# Patient Record
Sex: FEMALE | Race: WHITE | Employment: FULL TIME | URBAN - METROPOLITAN AREA
[De-identification: names, ages, dates, MRNs, and addresses within clinical notes are randomized per-mention and may not be internally consistent; named-entity substitution may affect disease eponyms.]

---

## 2019-03-17 ENCOUNTER — HOSPITAL ENCOUNTER (EMERGENCY)
Age: 34
Discharge: HOME | End: 2019-03-17
Attending: EMERGENCY MEDICINE
Payer: COMMERCIAL

## 2019-03-17 ENCOUNTER — APPOINTMENT (OUTPATIENT)
Dept: CT IMAGING | Age: 34
End: 2019-03-17
Payer: COMMERCIAL

## 2019-03-17 VITALS
OXYGEN SATURATION: 99 % | HEIGHT: 70 IN | SYSTOLIC BLOOD PRESSURE: 143 MMHG | BODY MASS INDEX: 21.47 KG/M2 | RESPIRATION RATE: 16 BRPM | TEMPERATURE: 97.7 F | HEART RATE: 90 BPM | WEIGHT: 150 LBS | DIASTOLIC BLOOD PRESSURE: 84 MMHG

## 2019-03-17 DIAGNOSIS — J02.9 ACUTE PHARYNGITIS, UNSPECIFIED ETIOLOGY: Primary | ICD-10-CM

## 2019-03-17 LAB
A/G RATIO: 1.6 (ref 1.1–2.2)
ALBUMIN SERPL-MCNC: 4.5 G/DL (ref 3.4–5)
ALP BLD-CCNC: 41 U/L (ref 40–129)
ALT SERPL-CCNC: 10 U/L (ref 10–40)
ANION GAP SERPL CALCULATED.3IONS-SCNC: 10 MMOL/L (ref 3–16)
AST SERPL-CCNC: 13 U/L (ref 15–37)
BASOPHILS ABSOLUTE: 0 K/UL (ref 0–0.2)
BASOPHILS RELATIVE PERCENT: 0.6 %
BILIRUB SERPL-MCNC: 0.4 MG/DL (ref 0–1)
BUN BLDV-MCNC: 10 MG/DL (ref 7–20)
CALCIUM SERPL-MCNC: 9.6 MG/DL (ref 8.3–10.6)
CHLORIDE BLD-SCNC: 102 MMOL/L (ref 99–110)
CO2: 27 MMOL/L (ref 21–32)
CREAT SERPL-MCNC: 0.7 MG/DL (ref 0.6–1.1)
EOSINOPHILS ABSOLUTE: 0.1 K/UL (ref 0–0.6)
EOSINOPHILS RELATIVE PERCENT: 1.1 %
GFR AFRICAN AMERICAN: >60
GFR NON-AFRICAN AMERICAN: >60
GLOBULIN: 2.9 G/DL
GLUCOSE BLD-MCNC: 134 MG/DL (ref 70–99)
HCG QUALITATIVE: NEGATIVE
HCT VFR BLD CALC: 44.4 % (ref 36–48)
HEMOGLOBIN: 14.9 G/DL (ref 12–16)
LYMPHOCYTES ABSOLUTE: 1.1 K/UL (ref 1–5.1)
LYMPHOCYTES RELATIVE PERCENT: 14.1 %
MCH RBC QN AUTO: 31.6 PG (ref 26–34)
MCHC RBC AUTO-ENTMCNC: 33.7 G/DL (ref 31–36)
MCV RBC AUTO: 94 FL (ref 80–100)
MONO TEST: NEGATIVE
MONOCYTES ABSOLUTE: 0.4 K/UL (ref 0–1.3)
MONOCYTES RELATIVE PERCENT: 5.4 %
NEUTROPHILS ABSOLUTE: 5.9 K/UL (ref 1.7–7.7)
NEUTROPHILS RELATIVE PERCENT: 78.8 %
PDW BLD-RTO: 12.6 % (ref 12.4–15.4)
PLATELET # BLD: 220 K/UL (ref 135–450)
PMV BLD AUTO: 7.5 FL (ref 5–10.5)
POTASSIUM SERPL-SCNC: 4.2 MMOL/L (ref 3.5–5.1)
RBC # BLD: 4.72 M/UL (ref 4–5.2)
S PYO AG THROAT QL: NEGATIVE
SODIUM BLD-SCNC: 139 MMOL/L (ref 136–145)
TOTAL PROTEIN: 7.4 G/DL (ref 6.4–8.2)
WBC # BLD: 7.6 K/UL (ref 4–11)

## 2019-03-17 PROCEDURE — 6360000002 HC RX W HCPCS: Performed by: PHYSICIAN ASSISTANT

## 2019-03-17 PROCEDURE — 87081 CULTURE SCREEN ONLY: CPT

## 2019-03-17 PROCEDURE — 86308 HETEROPHILE ANTIBODY SCREEN: CPT

## 2019-03-17 PROCEDURE — 87880 STREP A ASSAY W/OPTIC: CPT

## 2019-03-17 PROCEDURE — 70491 CT SOFT TISSUE NECK W/DYE: CPT

## 2019-03-17 PROCEDURE — 96374 THER/PROPH/DIAG INJ IV PUSH: CPT

## 2019-03-17 PROCEDURE — 85025 COMPLETE CBC W/AUTO DIFF WBC: CPT

## 2019-03-17 PROCEDURE — 84703 CHORIONIC GONADOTROPIN ASSAY: CPT

## 2019-03-17 PROCEDURE — 6360000004 HC RX CONTRAST MEDICATION: Performed by: EMERGENCY MEDICINE

## 2019-03-17 PROCEDURE — 87591 N.GONORRHOEAE DNA AMP PROB: CPT

## 2019-03-17 PROCEDURE — 80053 COMPREHEN METABOLIC PANEL: CPT

## 2019-03-17 PROCEDURE — 99283 EMERGENCY DEPT VISIT LOW MDM: CPT

## 2019-03-17 PROCEDURE — 87491 CHLMYD TRACH DNA AMP PROBE: CPT

## 2019-03-17 RX ORDER — DEXAMETHASONE 4 MG/1
10 TABLET ORAL ONCE
Status: COMPLETED | OUTPATIENT
Start: 2019-03-17 | End: 2019-03-17

## 2019-03-17 RX ORDER — KETOROLAC TROMETHAMINE 30 MG/ML
30 INJECTION, SOLUTION INTRAMUSCULAR; INTRAVENOUS ONCE
Status: COMPLETED | OUTPATIENT
Start: 2019-03-17 | End: 2019-03-17

## 2019-03-17 RX ADMIN — KETOROLAC TROMETHAMINE 30 MG: 30 INJECTION, SOLUTION INTRAMUSCULAR at 17:45

## 2019-03-17 RX ADMIN — IOPAMIDOL 75 ML: 755 INJECTION, SOLUTION INTRAVENOUS at 18:09

## 2019-03-17 RX ADMIN — DEXAMETHASONE 10 MG: 4 TABLET ORAL at 17:45

## 2019-03-17 ASSESSMENT — ENCOUNTER SYMPTOMS
TROUBLE SWALLOWING: 0
SORE THROAT: 1
COUGH: 0
BACK PAIN: 0
NAUSEA: 0
BLOOD IN STOOL: 0
VOICE CHANGE: 0
DIARRHEA: 0
ABDOMINAL PAIN: 0
VOMITING: 0
SHORTNESS OF BREATH: 0

## 2019-03-17 ASSESSMENT — PAIN SCALES - GENERAL
PAINLEVEL_OUTOF10: 6
PAINLEVEL_OUTOF10: 6

## 2019-03-19 LAB
C TRACH DNA GENITAL QL NAA+PROBE: NEGATIVE
N. GONORRHOEAE DNA: NEGATIVE
S PYO THROAT QL CULT: NORMAL

## 2019-03-20 ENCOUNTER — OFFICE VISIT (OUTPATIENT)
Dept: ENT CLINIC | Age: 34
End: 2019-03-20
Payer: COMMERCIAL

## 2019-03-20 VITALS
WEIGHT: 150 LBS | HEART RATE: 65 BPM | BODY MASS INDEX: 21.47 KG/M2 | HEIGHT: 70 IN | DIASTOLIC BLOOD PRESSURE: 73 MMHG | SYSTOLIC BLOOD PRESSURE: 117 MMHG

## 2019-03-20 DIAGNOSIS — K21.9 LARYNGOPHARYNGEAL REFLUX (LPR): Primary | ICD-10-CM

## 2019-03-20 DIAGNOSIS — R09.89 GLOBUS SENSATION: ICD-10-CM

## 2019-03-20 PROCEDURE — 1036F TOBACCO NON-USER: CPT | Performed by: OTOLARYNGOLOGY

## 2019-03-20 PROCEDURE — G8420 CALC BMI NORM PARAMETERS: HCPCS | Performed by: OTOLARYNGOLOGY

## 2019-03-20 PROCEDURE — G8427 DOCREV CUR MEDS BY ELIG CLIN: HCPCS | Performed by: OTOLARYNGOLOGY

## 2019-03-20 PROCEDURE — 99204 OFFICE O/P NEW MOD 45 MIN: CPT | Performed by: OTOLARYNGOLOGY

## 2019-03-20 PROCEDURE — G8484 FLU IMMUNIZE NO ADMIN: HCPCS | Performed by: OTOLARYNGOLOGY

## 2019-03-20 ASSESSMENT — ENCOUNTER SYMPTOMS
COLOR CHANGE: 0
NAUSEA: 0
WHEEZING: 0
VOICE CHANGE: 0
CHOKING: 0
BLOOD IN STOOL: 0
SHORTNESS OF BREATH: 0
CONSTIPATION: 0
SORE THROAT: 0
BACK PAIN: 0
CHEST TIGHTNESS: 0
TROUBLE SWALLOWING: 0
VOMITING: 0
SINUS PRESSURE: 0
APNEA: 0
EYE PAIN: 0
DIARRHEA: 0
STRIDOR: 0
COUGH: 0
FACIAL SWELLING: 0
SINUS PAIN: 0
RHINORRHEA: 0
EYE DISCHARGE: 0

## 2019-04-23 ENCOUNTER — OFFICE VISIT (OUTPATIENT)
Dept: ENT CLINIC | Age: 34
End: 2019-04-23
Payer: COMMERCIAL

## 2019-04-23 VITALS
BODY MASS INDEX: 22.15 KG/M2 | HEIGHT: 70 IN | SYSTOLIC BLOOD PRESSURE: 107 MMHG | HEART RATE: 85 BPM | WEIGHT: 154.7 LBS | DIASTOLIC BLOOD PRESSURE: 69 MMHG

## 2019-04-23 DIAGNOSIS — J02.9 ACUTE PHARYNGITIS, UNSPECIFIED ETIOLOGY: ICD-10-CM

## 2019-04-23 DIAGNOSIS — E04.9 GOITER: ICD-10-CM

## 2019-04-23 DIAGNOSIS — E04.1 THYROID NODULE: ICD-10-CM

## 2019-04-23 DIAGNOSIS — J37.0 CHRONIC LARYNGITIS: ICD-10-CM

## 2019-04-23 DIAGNOSIS — K21.9 LPRD (LARYNGOPHARYNGEAL REFLUX DISEASE): Primary | ICD-10-CM

## 2019-04-23 PROCEDURE — 99214 OFFICE O/P EST MOD 30 MIN: CPT | Performed by: OTOLARYNGOLOGY

## 2019-04-23 PROCEDURE — 1036F TOBACCO NON-USER: CPT | Performed by: OTOLARYNGOLOGY

## 2019-04-23 PROCEDURE — G8427 DOCREV CUR MEDS BY ELIG CLIN: HCPCS | Performed by: OTOLARYNGOLOGY

## 2019-04-23 PROCEDURE — G8420 CALC BMI NORM PARAMETERS: HCPCS | Performed by: OTOLARYNGOLOGY

## 2019-04-23 RX ORDER — AMOXICILLIN AND CLAVULANATE POTASSIUM 875; 125 MG/1; MG/1
TABLET, FILM COATED ORAL
Refills: 0 | COMMUNITY
Start: 2019-03-12 | End: 2019-06-24 | Stop reason: ALTCHOICE

## 2019-04-23 RX ORDER — OMEPRAZOLE 20 MG/1
20 CAPSULE, DELAYED RELEASE ORAL DAILY
COMMUNITY
Start: 2019-03-20 | End: 2019-04-23 | Stop reason: DRUGHIGH

## 2019-04-23 RX ORDER — AMOXICILLIN AND CLAVULANATE POTASSIUM 875; 125 MG/1; MG/1
1 TABLET, FILM COATED ORAL 2 TIMES DAILY
Qty: 10 TABLET | Refills: 0 | Status: SHIPPED | OUTPATIENT
Start: 2019-04-23 | End: 2019-04-28

## 2019-04-23 RX ORDER — OMEPRAZOLE 20 MG/1
CAPSULE, DELAYED RELEASE ORAL
Qty: 180 CAPSULE | Refills: 1 | Status: SHIPPED | OUTPATIENT
Start: 2019-04-23 | End: 2019-09-30 | Stop reason: SDUPTHER

## 2019-04-23 NOTE — PROGRESS NOTES
[facility]       PCP:  No primary care provider on file. CHIEF COMPLAINT:  Chief Complaint   Patient presents with    Pharyngitis         HISTORY OF PRESENT ILLNESS:   (Location, Quality, Severity, Duration, Timing, Context, Modifying factors, Associated symptoms)  Clyde Barrett is a 26 y.o. female, here today for evaluation of a persistent sore throat onset about 02/17/19. She was seen in [facility] ER on 3/17/19 for evaluation and treatment of a 4 week course of a sore throat. She was given no medication. She was referred to me, but \"there was a long wait so I saw Dr. Christal Moran. \"  Dr. Christal Moran prescribed Prilosec once daily. At the beginning of March, [facility]  She was treated with Amoxicillin-clavulanate. She took it for 5-6 days and then stopped \"because it didn't seem to be helping and it was making me sicker. \"  She then saw Dr. Christal Moran, who \"thought it was probably acid reflux so he recommended that I start taking Prilosec OTC and if I was not better, to come back in four weeks. \"  But she wanted to come here instead. \"I restarted the Amoxi-clav on Saturday morning. I have 1 or 2 pills left. \"  She stated that he throat problem is mostly during the day time. \"It doesn't bother me much at night. \"  Currently, \"it's at a 4\" on a 1-10 scale. \"Yesterday, it hardly bothered me at all. \"    She is a former minimal cigarette smoker. She smoked about 1/3 ppd for 1 year. She quit about 15 years ago. She works as a school teachers aid. REVIEW OF SYSTEMS:  CONSTITUTIONAL:  Denied fever and chills. Denied unexplained weight loss. EARS, NOSE, THROAT:   (+) Tinnitus, ringing in both ears off and on, for one month. Denied otorrhea, otalgia, hearing loss, nasal dyspnea, rhinorrhea, and chronic hoarseness. ALLERGIC: seasonal allergies summer use Claritin which helps.         PAST MEDICAL HISTORY: History reviewed. No pertinent past medical history. Past Surgical History:   Procedure Laterality Date    WISDOM TOOTH EXTRACTION            EXAMINATION:     VITALS SIGNS:    Vitals:    04/23/19 1450   BP: 107/69   Pulse: 85   Weight: 154 lb 11.2 oz (70.2 kg)   Height: 5' 10\" (1.778 m)      GENERAL APPEARANCE: Well developed, well nourished, no apparent distress, no apparent deformities. ABILITY TO COMMUNICATE/QUALITY OF VOICE:  Communicated without difficulty. Normal voice. INSPECTION, HEAD AND FACE: Normal overall appearance, with no scars, lesions or masses. SINUSES:   The maxillary and frontal sinuses were nontender, bilaterally. EXTERNAL EAR/NOSE:  Normal pinnae and mastoids. Normal external nose. EARS, OTOSCOPY: The TMs and EACs appeared to be normal bilaterally. (+) NOSE:  There was a mild right inferior septal spur. The inferior turbinates were pale and bluish. The mucosa was mildly congested. Otherwise, the nasal septum was midline. The nasal secretions were normal.  No pus or polyps were seen. LIPS, TEETH AND GUMS:  Unremarkable.   (+) OROPHARYNX/ORAL CAVITY:  The tonsils were 2-3+ and cryptic, with no erythema and no exudate. There were moderate right and left hira mandibularis. Oral mucosa, hard and soft palates, tongue, and pharynx were normal.      INDIRECT LARYNGOSCOPY:  Vocal cords normally mobile bilaterally with midline approximation on phonation. The epiglottis, supraglottis, false vocal cords, true vocal cords, mobility of the larynx, base of tongue, subglottis, and piriform sinuses appeared to be normal.   NASOPHARYNX, MIRROR EXAM:  Normal bilateral eustachian tube orifices, fossa of Rosenmüller, torus tubarius, and nasopharyngeal walls, with no apparent lesions or masses. NECK:  No masses or tenderness. No abnormal appearance, asymmetry or abnormal tracheal position. Laryngeal cartilages and hyoid bone were normal to palpation.    LYMPH NODES, CERVICAL, FACIAL AND SUPRACLAVICULAR:  No lymphadenopathy.  (+) THYROID:  The right thyroid lobe was enlarged. There was nodularity to the left lobe. CRANIAL NERVES:  II - XII intact, with no apparent deficits. IMPRESSION / Galindo Rogel / Kym Radford / PROCEDURES:       Yan Brandt was seen today for pharyngitis. Diagnoses and all orders for this visit:    LPRD (laryngopharyngeal reflux disease)  -     omeprazole (PRILOSEC) 20 MG delayed release capsule; Take 1 capsule by mouth 2 times daily; take on an empty stomach and eat a meal or snack 45 to 60 minutes hour after each dose. Chronic laryngitis  -     omeprazole (PRILOSEC) 20 MG delayed release capsule; Take 1 capsule by mouth 2 times daily; take on an empty stomach and eat a meal or snack 45 to 60 minutes hour after each dose. Acute pharyngitis, unspecified etiology  -     amoxicillin-clavulanate (AUGMENTIN) 875-125 MG per tablet; Take 1 tablet by mouth 2 times daily for 5 days more after current supply. Goiter    Thyroid nodule         RECOMMENDATIONS / PLAN:    1. Increase does of omeprazole to LPRD regimen of twice daily. 2. See patient instructions on file for this visit, which were fully discussed with the patient. 3. Thyroid ultrasound. 4. Return in about 2 months (around 6/23/2019) for recheck/follow-up, and sooner if condition worsens. >> Please note portions of this note were completed with a voice recognition program.  Efforts were made to edit the dictations but occasionally words are mis-transcribed.

## 2019-05-08 PROBLEM — K21.9 LPRD (LARYNGOPHARYNGEAL REFLUX DISEASE): Status: ACTIVE | Noted: 2019-05-08

## 2019-05-08 PROBLEM — J02.9 ACUTE PHARYNGITIS: Status: ACTIVE | Noted: 2019-05-08

## 2019-05-08 PROBLEM — J37.0 CHRONIC LARYNGITIS: Status: ACTIVE | Noted: 2019-05-08

## 2019-06-24 ENCOUNTER — OFFICE VISIT (OUTPATIENT)
Dept: ENT CLINIC | Age: 34
End: 2019-06-24
Payer: COMMERCIAL

## 2019-06-24 VITALS
BODY MASS INDEX: 21.32 KG/M2 | SYSTOLIC BLOOD PRESSURE: 104 MMHG | DIASTOLIC BLOOD PRESSURE: 64 MMHG | WEIGHT: 148.9 LBS | HEART RATE: 63 BPM | HEIGHT: 70 IN

## 2019-06-24 DIAGNOSIS — K21.9 LPRD (LARYNGOPHARYNGEAL REFLUX DISEASE): Primary | ICD-10-CM

## 2019-06-24 DIAGNOSIS — J37.0 CHRONIC LARYNGITIS: ICD-10-CM

## 2019-06-24 PROCEDURE — 31505 DIAGNOSTIC LARYNGOSCOPY: CPT | Performed by: OTOLARYNGOLOGY

## 2019-06-24 NOTE — PROGRESS NOTES
HISTORY:   \"I feel 85% better. On some days, I barely fell it at all. I ate pizza yesterday and I'm feeling it today. I have noticed certain things that I eat that make it worse. But, definitely better. \"  She stated that the sensation of a lump in her throat is gone. She is clearing her throat much less often. Her cough is much less. She still feels like an early sore throat. INDIRECT LARYNGOSCOPY:   Minimal reflux effect. Vocal cords normally mobile bilaterally with midline approximation on phonation. The epiglottis, supraglottis, false vocal cords, true vocal cords, mobility of the larynx, base of tongue, subglottis, and piriform sinuses appeared to be normal.          IMPRESSION / DIAGNOSES / Clive Ambrosio / PROCEDURES:       Kenneth An was seen today for pain and laryngitis. Diagnoses and all orders for this visit:    LPRD (laryngopharyngeal reflux disease)    Chronic laryngitis        RECOMMENDATIONS / PLAN:      Return in about 2 months (around 8/24/2019) for repeat flexible fiberoptic throat endoscopy, recheck/follow-up, and sooner if condition worsens.

## 2019-08-26 ENCOUNTER — OFFICE VISIT (OUTPATIENT)
Dept: ENT CLINIC | Age: 34
End: 2019-08-26
Payer: COMMERCIAL

## 2019-08-26 VITALS
BODY MASS INDEX: 21.47 KG/M2 | DIASTOLIC BLOOD PRESSURE: 74 MMHG | WEIGHT: 150 LBS | HEIGHT: 70 IN | SYSTOLIC BLOOD PRESSURE: 121 MMHG | HEART RATE: 61 BPM

## 2019-08-26 DIAGNOSIS — K21.9 LPRD (LARYNGOPHARYNGEAL REFLUX DISEASE): ICD-10-CM

## 2019-08-26 DIAGNOSIS — J37.0 CHRONIC LARYNGITIS: ICD-10-CM

## 2019-08-26 PROCEDURE — 31575 DIAGNOSTIC LARYNGOSCOPY: CPT | Performed by: OTOLARYNGOLOGY

## 2019-12-12 DIAGNOSIS — J37.0 CHRONIC LARYNGITIS: ICD-10-CM

## 2019-12-12 DIAGNOSIS — K21.9 LPRD (LARYNGOPHARYNGEAL REFLUX DISEASE): ICD-10-CM

## 2019-12-12 RX ORDER — OMEPRAZOLE 20 MG/1
CAPSULE, DELAYED RELEASE ORAL
Qty: 180 CAPSULE | Refills: 0 | Status: SHIPPED | OUTPATIENT
Start: 2019-12-12 | End: 2020-03-17

## 2020-02-26 ENCOUNTER — OFFICE VISIT (OUTPATIENT)
Dept: ENT CLINIC | Age: 35
End: 2020-02-26
Payer: COMMERCIAL

## 2020-02-26 VITALS
HEART RATE: 57 BPM | DIASTOLIC BLOOD PRESSURE: 68 MMHG | BODY MASS INDEX: 21.47 KG/M2 | HEIGHT: 70 IN | WEIGHT: 150 LBS | SYSTOLIC BLOOD PRESSURE: 101 MMHG

## 2020-02-26 PROCEDURE — 31575 DIAGNOSTIC LARYNGOSCOPY: CPT | Performed by: OTOLARYNGOLOGY

## 2020-08-07 ENCOUNTER — HOSPITAL ENCOUNTER (OUTPATIENT)
Dept: ULTRASOUND IMAGING | Age: 35
Discharge: HOME | End: 2020-08-07
Payer: COMMERCIAL

## 2020-08-07 PROCEDURE — 76700 US EXAM ABDOM COMPLETE: CPT

## 2020-08-19 ENCOUNTER — OFFICE VISIT (OUTPATIENT)
Dept: ENT CLINIC | Age: 35
End: 2020-08-19
Payer: COMMERCIAL

## 2020-08-19 VITALS — DIASTOLIC BLOOD PRESSURE: 68 MMHG | HEART RATE: 68 BPM | SYSTOLIC BLOOD PRESSURE: 114 MMHG | TEMPERATURE: 98.2 F

## 2020-08-19 PROCEDURE — 31575 DIAGNOSTIC LARYNGOSCOPY: CPT | Performed by: OTOLARYNGOLOGY

## 2020-08-19 NOTE — PROGRESS NOTES
end of the flexible scope. The nasopharynx, eustachian tube orifices and fossa of Rosenmüller, oropharynx, base of tongue, hypopharynx, supraglottis, subglottis, and piriform sinuses all appeared to be normal, with no lesions. Visualization was excellent throughout the examination. DESCRIPTION OF PROCEDURE:  The right and left nasal cavity was topically anesthetized and decongested with a 50-50 mixture of 0.05% oxymetazoline solution and topical 4% lidocaine solution by nasal sprayer, twice. After about ten minutes, the flexible fiberoptic nasopharyngolaryngoscope, with camera attached, was inserted through the right nare/nasal cavity and advanced to the nasopharynx and then to the hypopharynx and larynx. The [employer] video system was used. After adequate endoscopic visualization, the endoscope was removed. The patient appeared to tolerate the procedure well with no evidence of perioperative complications. Viet Mena / Pauline Block / Bertha Gomez was seen today for laryngitis. Diagnoses and all orders for this visit:    LPRD (laryngopharyngeal reflux disease)    Chronic laryngitis         RECOMMENDATIONS / PLAN    1. Return in about 6 months (around 2/19/2021) for repeat flexible fiberoptic throat endoscopy, recheck/follow-up, and sooner if condition worsens. 2. Will consider cessation of omeprazole therapy if patient remains asymptomatic on once daily therapy. Patient Instructions   1. Decrease omeprazole to once daily. Take by mouth on an empty stomach (when you have not eaten or drunk anything for two hours) and eat a meal or snack 45 to 60 minutes after each dose. 2. If throat symptoms increase, resume twice daily dosing and call office to report. 3. Proceed with evaluation by gastroenterology.

## 2021-10-14 ENCOUNTER — OFFICE VISIT (OUTPATIENT)
Dept: ENT CLINIC | Age: 36
End: 2021-10-14
Payer: COMMERCIAL

## 2021-10-14 VITALS — DIASTOLIC BLOOD PRESSURE: 73 MMHG | TEMPERATURE: 97.7 F | SYSTOLIC BLOOD PRESSURE: 111 MMHG

## 2021-10-14 DIAGNOSIS — J37.0 CHRONIC LARYNGITIS: Chronic | ICD-10-CM

## 2021-10-14 DIAGNOSIS — K21.9 LPRD (LARYNGOPHARYNGEAL REFLUX DISEASE): Primary | Chronic | ICD-10-CM

## 2021-10-14 PROCEDURE — 31575 DIAGNOSTIC LARYNGOSCOPY: CPT | Performed by: OTOLARYNGOLOGY

## 2021-10-14 RX ORDER — OMEPRAZOLE 20 MG/1
CAPSULE, DELAYED RELEASE ORAL
Qty: 180 CAPSULE | Refills: 0 | Status: SHIPPED | OUTPATIENT
Start: 2021-10-14 | End: 2022-01-13 | Stop reason: SDUPTHER

## 2021-10-14 NOTE — PROGRESS NOTES
[facility]       NEW PATIENT VISIT      PCP:  [facility]  Chief Complaint   Patient presents with    Laryngitis     recurrence of LPRD symptoms       HISTORY OF PRESENT ILLNESS       Roxianne Kocher is a 43 y.o. female here for recheck and follow up of LPRD. \"I can hear it and feel bubbles in my throat. Clearing throat frequently. feeling of mucus in throat that can't clear. \"  Feels a lump in the throat. Using omeprazole OTC once daily, started one week ago when started feeling the lump in the throat that's what's intense, it bothers me. No hoarseness, no cough,         PROCEDURE:  FLEXIBLE FIBEROPTIC NASOPHARYNGOLARYNGOSCOPY  INDICATION:  Need for detailed endoscopic examination of the larynx and pharynx to evaluate the upper aerodigestive tract for recheck LPRD and chronic reflux laryngitis. INFORMED CONSENT:  The procedure was described to the patient, including method of anesthesia. The patient was advised of the medical necessity for this procedure. The risks and potential complications were discussed, including, but not limited to, bleeding, infection, adverse reaction to medications, hoarseness, sore throat, inability to obtain adequate visualization, and possible future need for rigid operative endoscopy. The expected outcome, potential benefits and the alternatives of therapy were discussed. [address] [place] [address] asked appropriate questions and then expressed the lack of any further questions, understanding, acceptance, and the desire to undergo with this procedure, granting verbal informed consent. FINDINGS:  There was mild edema and erythema of the arytenoid and interarytenoid mucosa consistent with posterior laryngitis secondary to laryngopharyngeal reflux.   The vocal cords appeared to be normal, with no nodule, ulceration, polyp, leukoplakia or other lesions, and appeared to be normally mobile bilaterally with midline approximation on phonation. Sensation of the hypopharynx and larynx appeared to be normal when touched by the end of the flexible scope. The nasopharynx, eustachian tube orifices and fossa of Rosenmüller, oropharynx, base of tongue, hypopharynx, supraglottis, subglottis, and piriform sinuses all appeared to be normal, with no lesions. Visualization was excellent throughout the examination. DESCRIPTION OF PROCEDURE:  The right and left nasal cavity was topically anesthetized and decongested with a 50-50 mixture of 0.05% oxymetazoline solution and topical 4% lidocaine solution by nasal sprayer, twice. After about ten minutes, the flexible fiberoptic nasopharyngolaryngoscope, with camera attached, was inserted through the right nare/nasal cavity and advanced to the nasopharynx and then to the hypopharynx and larynx. The [employer] video system was used. After adequate endoscopic visualization, the endoscope was removed. The patient appeared to tolerate the procedure well with no evidence of perioperative complications. Saurabh Moulton / Missy Saul / Nathaniel Moura was seen today for laryngitis. Diagnoses and all orders for this visit:    LPRD (laryngopharyngeal reflux disease)  -     omeprazole (PRILOSEC) 20 MG delayed release capsule; TAKE 1 CAPSULE BY MOUTH TWICE A DAY, ON EMPTY STOMACH (WHEN NOT EATEN/DRUNK ANYTHING FOR 2 HOURS) AND EAT MEAL/SNACK 45 - 60 MINUTES AFTER EACH DOSE    Chronic laryngitis  -     omeprazole (PRILOSEC) 20 MG delayed release capsule; TAKE 1 CAPSULE BY MOUTH TWICE A DAY, ON EMPTY STOMACH (WHEN NOT EATEN/DRUNK ANYTHING FOR 2 HOURS) AND EAT MEAL/SNACK 45 - 60 MINUTES AFTER EACH DOSE         RECOMMENDATIONS / PLAN    1. Increase omeprazole to twice daily LPRD regimen. 2. Return in about 3 months (around 1/14/2022) for repeat flexible fiberoptic throat endoscopy, recheck/follow-up, and sooner if condition worsens.

## 2022-01-13 ENCOUNTER — OFFICE VISIT (OUTPATIENT)
Dept: ENT CLINIC | Age: 37
End: 2022-01-13
Payer: COMMERCIAL

## 2022-01-13 VITALS
HEART RATE: 78 BPM | SYSTOLIC BLOOD PRESSURE: 121 MMHG | DIASTOLIC BLOOD PRESSURE: 86 MMHG | OXYGEN SATURATION: 98 % | TEMPERATURE: 97.5 F

## 2022-01-13 DIAGNOSIS — K21.9 LPRD (LARYNGOPHARYNGEAL REFLUX DISEASE): Primary | Chronic | ICD-10-CM

## 2022-01-13 PROCEDURE — 31575 DIAGNOSTIC LARYNGOSCOPY: CPT | Performed by: OTOLARYNGOLOGY

## 2022-01-13 RX ORDER — OMEPRAZOLE 20 MG/1
CAPSULE, DELAYED RELEASE ORAL
Qty: 180 CAPSULE | Refills: 1 | Status: SHIPPED | OUTPATIENT
Start: 2022-01-13 | End: 2022-07-13 | Stop reason: SDUPTHER

## 2022-01-13 NOTE — PROGRESS NOTES
Chief Complaint   Patient presents with    Laryngitis     LPRD (laryngopharyngeal reflux disease), had COVID 2 weeks ago, which caused a flare up but she says she feels better        PROCEDURE:  FLEXIBLE FIBEROPTIC NASOPHARYNGOLARYNGOSCOPY  INDICATION:  Inadequate visualization by indirect laryngoscopy mirror examination and need for detailed endoscopic examination of the larynx and pharynx to evaluate the upper aerodigestive tract for recheck LPRD and chronic reflux laryngitis. stopped omeprazole for 5 days two weeks ago, due to COVID-19 infection       INFORMED CONSENT:  The procedure was described to the patient, including method of anesthesia. The patient was advised of the medical necessity for this procedure. The risks and potential complications were discussed, including, but not limited to, bleeding, infection, adverse reaction to medications, hoarseness, sore throat, inability to obtain adequate visualization, and possible future need for rigid operative endoscopy. The expected outcome, potential benefits and the alternatives of therapy were discussed. Lavinia Gonzalez asked appropriate questions and then expressed the lack of any further questions, understanding, acceptance, and the desire to undergo with this procedure, granting verbal informed consent. FINDINGS:  There was mild edema and erythema of the arytenoid and interarytenoid mucosa consistent with posterior laryngitis secondary to laryngopharyngeal reflux. The vocal cords appeared to be normal, with no nodule, ulceration, polyp, leukoplakia or other lesions, and appeared to be normally mobile bilaterally with midline approximation on phonation. Sensation of the hypopharynx and larynx appeared to be normal when touched by the end of the flexible scope.  The nasopharynx, eustachian tube orifices and fossa of Rosenmüller, oropharynx, base of tongue, hypopharynx, supraglottis, subglottis, and piriform sinuses all appeared to be normal, with no lesions. Visualization was excellent throughout the examination. DESCRIPTION OF PROCEDURE:  The right and left nasal cavity was topically anesthetized and decongested with a 50-50 mixture of 0.05% oxymetazoline solution and topical 4% lidocaine solution by nasal sprayer, twice. After about ten minutes, the flexible fiberoptic nasopharyngolaryngoscope, with camera attached, was inserted through the right nare/nasal cavity and advanced to the nasopharynx and then to the hypopharynx and larynx. The [employer] video system was used. After adequate endoscopic visualization, the endoscope was removed. The patient appeared to tolerate the procedure well with no evidence of perioperative complications. Silvestre Colin / Serina Cerna / Javid Miguel was seen today for laryngitis. Diagnoses and all orders for this visit:    LPRD (laryngopharyngeal reflux disease)  -     omeprazole (PRILOSEC) 20 MG delayed release capsule; TAKE 1 CAPSULE BY MOUTH TWICE A DAY, ON EMPTY STOMACH (WHEN NOT EATEN/DRUNK ANYTHING FOR 2 HOURS) AND EAT MEAL/SNACK 45 - 60 MINUTES AFTER EACH DOSE           RECOMMENDATIONS / PLAN     Return in about 6 months (around 7/13/2022) for repeat flexible fiberoptic throat endoscopy, recheck/follow-up, and sooner if condition worsens.

## 2022-07-13 ENCOUNTER — OFFICE VISIT (OUTPATIENT)
Dept: ENT CLINIC | Age: 37
End: 2022-07-13
Payer: COMMERCIAL

## 2022-07-13 VITALS
TEMPERATURE: 97.5 F | DIASTOLIC BLOOD PRESSURE: 68 MMHG | SYSTOLIC BLOOD PRESSURE: 102 MMHG | OXYGEN SATURATION: 98 % | HEART RATE: 58 BPM

## 2022-07-13 DIAGNOSIS — K21.9 LPRD (LARYNGOPHARYNGEAL REFLUX DISEASE): Primary | ICD-10-CM

## 2022-07-13 PROCEDURE — 31575 DIAGNOSTIC LARYNGOSCOPY: CPT | Performed by: OTOLARYNGOLOGY

## 2022-07-13 RX ORDER — OMEPRAZOLE 20 MG/1
CAPSULE, DELAYED RELEASE ORAL
Qty: 90 CAPSULE | Refills: 0 | Status: SHIPPED | OUTPATIENT
Start: 2022-07-13

## 2022-07-13 NOTE — PROGRESS NOTES
Chief Complaint   Patient presents with    Laryngitis     Recheck LPRD        PROCEDURE:  FLEXIBLE FIBEROPTIC NASOPHARYNGOLARYNGOSCOPY  INDICATION:  Inadequate visualization by indirect laryngoscopy mirror examination and need for detailed endoscopic examination of the larynx and pharynx to evaluate the upper aerodigestive tract for recheck LPRD and chronic reflux laryngitis. Using omeprazole once daily for the past 4 months. Throat has not gotten worse. No throat symptoms currently, and none for about a years. INFORMED CONSENT:  The procedure was described to the patient, including method of anesthesia. The patient was advised of the medical necessity for this procedure. The risks and potential complications were discussed, including, but not limited to, bleeding, infection, adverse reaction to medications, hoarseness, sore throat, and inability to obtain adequate visualization. The expected outcome, potential benefits and the alternatives of therapy were discussed. Edwards asked appropriate questions and then expressed the lack of any further questions, understanding, acceptance, and the desire to undergo with this procedure, granting verbal informed consent. FINDINGS:  The vocal cords appeared to be normal, with no nodule, ulceration, polyp, leukoplakia or other lesions, and appeared to be normally mobile bilaterally with midline approximation on phonation. Sensation of the hypopharynx and larynx appeared to be normal when touched by the end of the flexible scope. The nasopharynx, eustachian tube orifices and fossa of Rosenmüller, oropharynx, base of tongue, hypopharynx, supraglottis, subglottis, and piriform sinuses all appeared to be normal, with no lesions. Visualization was excellent throughout the examination.          DESCRIPTION OF PROCEDURE:  The right and left nasal cavity was topically anesthetized and decongested with a 50-50 mixture of 0.05% oxymetazoline solution and topical 4% lidocaine solution by nasal sprayer, twice. After about ten minutes, the flexible fiberoptic nasopharyngolaryngoscope, with camera attached, was inserted through the right nare/nasal cavity and advanced to the nasopharynx and then to the hypopharynx and larynx. The [employer] video system was used. After adequate endoscopic visualization, the endoscope was removed. The patient appeared to tolerate the procedure well with no evidence of perioperative complications. Olya Lama / Bishop Cosby / Deshawn Hinds was seen today for laryngitis. Diagnoses and all orders for this visit:    LPRD (laryngopharyngeal reflux disease)  -     omeprazole (PRILOSEC) 20 MG delayed release capsule; TAKE 1 CAPSULE BY MOUTH ONCE A DAY, ON EMPTY STOMACH (WHEN NOT EATEN/DRUNK ANYTHING FOR 2 HOURS) AND EAT MEAL/SNACK 45 - 60 MINUTES AFTER EACH DOSE       RECOMMENDATIONS / PLAN    Patient Instructions   Decrease omeprazole to every other day, on an empty stomach (when you have not eaten or drunk anything for two hours) and eat a meal or snack 45 to 60 minutes after each dose. If your throat symptoms do not recur in four weeks, stop omeprazole. If your reflux throat symptoms recur (feeling of a lump in the throat, frequent throat clearing, pain in the throat), resume twice daily therapy for 3 months and return to the office for examination. Follow up   Return if throat reflux symptoms recur or, for any further ENT or sinus problems or symptoms.

## 2022-07-13 NOTE — PATIENT INSTRUCTIONS
1. Decrease omeprazole to every other day, on an empty stomach (when you have not eaten or drunk anything for two hours) and eat a meal or snack 45 to 60 minutes after each dose. If your throat symptoms do not recur in four weeks, stop omeprazole. 2. If your reflux throat symptoms recur (feeling of a lump in the throat, frequent throat clearing, pain in the throat), resume twice daily therapy for 3 months and return to the office for examination.

## 2022-12-08 ENCOUNTER — OFFICE VISIT (OUTPATIENT)
Dept: ENT CLINIC | Age: 37
End: 2022-12-08
Payer: COMMERCIAL

## 2022-12-08 VITALS
DIASTOLIC BLOOD PRESSURE: 67 MMHG | TEMPERATURE: 97.3 F | HEIGHT: 70 IN | SYSTOLIC BLOOD PRESSURE: 108 MMHG | WEIGHT: 163 LBS | HEART RATE: 68 BPM | BODY MASS INDEX: 23.34 KG/M2

## 2022-12-08 DIAGNOSIS — J30.9 ALLERGIC RHINITIS, UNSPECIFIED SEASONALITY, UNSPECIFIED TRIGGER: Primary | Chronic | ICD-10-CM

## 2022-12-08 PROCEDURE — G8427 DOCREV CUR MEDS BY ELIG CLIN: HCPCS | Performed by: OTOLARYNGOLOGY

## 2022-12-08 PROCEDURE — 99213 OFFICE O/P EST LOW 20 MIN: CPT | Performed by: OTOLARYNGOLOGY

## 2022-12-08 PROCEDURE — 1036F TOBACCO NON-USER: CPT | Performed by: OTOLARYNGOLOGY

## 2022-12-08 PROCEDURE — G8484 FLU IMMUNIZE NO ADMIN: HCPCS | Performed by: OTOLARYNGOLOGY

## 2022-12-08 PROCEDURE — G8420 CALC BMI NORM PARAMETERS: HCPCS | Performed by: OTOLARYNGOLOGY

## 2022-12-08 ASSESSMENT — ENCOUNTER SYMPTOMS
RHINORRHEA: 1
SINUS PAIN: 0
SORE THROAT: 0

## 2022-12-13 LAB
2000687N OAK TREE IGE: <0.1 KU/L (ref 0–0.34)
ALLERGEN BERMUDA GRASS IGE: <0.1 KU/L (ref 0–0.34)
ALLERGEN BIRCH IGE: <0.1 KU/L (ref 0–0.34)
ALLERGEN DOG DANDER IGE: 0.57 KU/L (ref 0–0.34)
ALLERGEN GERMAN COCKROACH IGE: <0.1 KU/L (ref 0–0.34)
ALLERGEN HORMODENDRUM IGE: 0.7 KUL/L (ref 0–0.34)
ALLERGEN MOUSE EPITHELIA IGE: <0.1 KU/L (ref 0–0.34)
ALLERGEN PECAN TREE IGE: 0.97 KU/L (ref 0–0.34)
ALLERGEN PIGWEED ROUGH IGE: <0.1 KU/L (ref 0–0.34)
ALLERGEN SHEEP SORREL (W18) IGE: <0.1 KU/L (ref 0–0.34)
ALLERGEN TREE SYCAMORE: <0.1 KU/L (ref 0–0.34)
ALLERGEN WALNUT TREE IGE: 0.58 KU/L (ref 0–0.34)
ALLERGEN WHITE MULBERRY TREE, IGE: <0.1 KU/L (ref 0–0.34)
ALLERGEN, TREE, WHITE ASH IGE: <0.1 KU/L (ref 0–0.34)
ALTERNARIA ALTERNATA: 1.79 KU/L (ref 0–0.34)
ASPERGILLUS FUMIGATUS: 0.15 KU/L (ref 0–0.34)
CAT DANDER ANTIBODY: 1.41 KU/L (ref 0–0.34)
COTTONWOOD TREE: <0.1 KU/L (ref 0–0.34)
D. FARINAE: <0.1 KU/L (ref 0–0.34)
D. PTERONYSSINUS: <0.1 KU/L (ref 0–0.34)
ELM TREE: <0.1 KU/L (ref 0–0.34)
IGE: 52 IU/ML
MAPLE/BOXELDER TREE: <0.1 KU/L (ref 0–0.34)
MOUNTAIN CEDAR TREE: <0.1 KU/L (ref 0–0.34)
MUCOR RACEMOSUS: <0.1 KU/L (ref 0–0.34)
P. NOTATUM: 0.15 KU/L (ref 0–0.34)
RUSSIAN THISTLE: <0.1 KU/L (ref 0–0.34)
SHORT RAGWD(A ARTEMIS.) IGE: 0.82 KU/L (ref 0–0.34)
TIMOTHY GRASS: <0.1 KU/L (ref 0–0.34)